# Patient Record
Sex: FEMALE | Race: WHITE | NOT HISPANIC OR LATINO | URBAN - METROPOLITAN AREA
[De-identification: names, ages, dates, MRNs, and addresses within clinical notes are randomized per-mention and may not be internally consistent; named-entity substitution may affect disease eponyms.]

---

## 2017-10-09 ENCOUNTER — OFFICE VISIT (OUTPATIENT)
Dept: OBSTETRICS AND GYNECOLOGY | Age: 42
End: 2017-10-09

## 2017-10-09 VITALS
WEIGHT: 180 LBS | HEIGHT: 66 IN | BODY MASS INDEX: 28.93 KG/M2 | DIASTOLIC BLOOD PRESSURE: 82 MMHG | SYSTOLIC BLOOD PRESSURE: 122 MMHG

## 2017-10-09 DIAGNOSIS — Z11.51 SCREENING FOR HPV (HUMAN PAPILLOMAVIRUS): ICD-10-CM

## 2017-10-09 DIAGNOSIS — Z01.419 VISIT FOR GYNECOLOGIC EXAMINATION: Primary | ICD-10-CM

## 2017-10-09 PROCEDURE — 99396 PREV VISIT EST AGE 40-64: CPT | Performed by: OBSTETRICS & GYNECOLOGY

## 2017-10-09 RX ORDER — MULTIVITAMIN
TABLET ORAL
COMMUNITY

## 2017-10-09 RX ORDER — FOLIC ACID 1 MG/1
1 TABLET ORAL DAILY
COMMUNITY

## 2017-10-09 NOTE — PROGRESS NOTES
"Subjective     Chief Complaint   Patient presents with   • Gynecologic Exam     annual exam       History of Present Illness    Tanvi Gaytan is a 42 y.o.  who presents for annual exam.  Her menses are regular every 28-30 days, lasting 4-7 days, dysmenorrhea none   No gyn issues  Obstetric History:  OB History      Para Term  AB Living    6 4 2 2 2 2    SAB TAB Ectopic Multiple Live Births    2    2         Menstrual History:     Patient's last menstrual period was 10/03/2017 (exact date).         Current contraception: vasectomy  History of abnormal Pap smear: no  Received Gardasil immunization: no  Perform regular self breast exam: yes - monthly  Family history of uterine or ovarian cancer: no  Family History of colon cancer: no  Family history of breast cancer: yes - distant cousin    Mammogram: done today.  Colonoscopy: up to date.  DEXA: not indicated.    Exercise: moderately active  Calcium/Vitamin D: adequate intake    The following portions of the patient's history were reviewed and updated as appropriate: allergies, current medications, past family history, past medical history, past social history, past surgical history and problem list.    Review of Systems    Review of Systems   Constitutional: Negative for fatigue.   Respiratory: Negative for shortness of breath.    Gastrointestinal: Negative for abdominal pain.   Genitourinary: Negative for dysuria. negative for abnormal bleeding or pelvic pain  Neurological: Negative for headaches.   Psychiatric/Behavioral: Negative for dysphoric mood.         Objective   Physical Exam    /82  Ht 66\" (167.6 cm)  Wt 180 lb (81.6 kg)  LMP 10/03/2017 (Exact Date)  BMI 29.05 kg/m2    General:   alert, appears stated age, cooperative and no distress   Neck: no asymmetry, masses, or scars and thyroid normal to palpation   Heart: regular rate and rhythm, S1, S2 normal, no murmur, click, rub or gallop   Lungs: clear to auscultation bilaterally "   Abdomen: soft, non-tender, without masses or organomegaly   Breast: inspection negative, no nipple discharge or bleeding, no masses or nodularity palpable and no axilllary adenopathy   Vulva: normal, Bartholin's, Urethra, [place]'s normal   Vagina: normal mucosa, normal discharge   Cervix: no lesions   Uterus: normal size, mobile, non-tender, normal shape and consistency   Adnexa: normal adnexa and no mass, fullness, tenderness   Rectal: not indicated     Assessment/Plan   Tanvi was seen today for gynecologic exam.    Diagnoses and all orders for this visit:    Visit for gynecologic examination  -     IGP, Apt HPV,rfx 16 / 18,45 - ThinPrep Vial, Cervix    Screening for HPV (human papillomavirus)  -     IGP, Apt HPV,rfx 16 / 18,45 - ThinPrep Vial, Cervix        All questions answered.  Breast self exam technique reviewed and patient encouraged to perform self-exam monthly.  Discussed healthy lifestyle modifications.  Recommended 30 minutes of aerobic exercise five times per week.  Pap negative last year with negative HPV, no ECC's noted so repeated-will call pt with results

## 2017-10-12 LAB
CYTOLOGIST CVX/VAG CYTO: NORMAL
CYTOLOGY CVX/VAG DOC THIN PREP: NORMAL
DX ICD CODE: NORMAL
HIV 1 & 2 AB SER-IMP: NORMAL
HPV I/H RISK 4 DNA CVX QL PROBE+SIG AMP: NEGATIVE
OTHER STN SPEC: NORMAL
PATH REPORT.FINAL DX SPEC: NORMAL
STAT OF ADQ CVX/VAG CYTO-IMP: NORMAL

## 2017-10-13 ENCOUNTER — TELEPHONE (OUTPATIENT)
Dept: OBSTETRICS AND GYNECOLOGY | Age: 42
End: 2017-10-13

## 2017-10-13 NOTE — TELEPHONE ENCOUNTER
----- Message from Marcela Winters MD sent at 10/13/2017  9:19 AM EDT -----  Call pt, pap is negative and satisfactory with endocervical cells this year, negative hpv also

## 2018-10-23 ENCOUNTER — OFFICE VISIT (OUTPATIENT)
Dept: OBSTETRICS AND GYNECOLOGY | Age: 43
End: 2018-10-23

## 2018-10-23 ENCOUNTER — PROCEDURE VISIT (OUTPATIENT)
Dept: OBSTETRICS AND GYNECOLOGY | Age: 43
End: 2018-10-23

## 2018-10-23 ENCOUNTER — APPOINTMENT (OUTPATIENT)
Dept: WOMENS IMAGING | Facility: HOSPITAL | Age: 43
End: 2018-10-23

## 2018-10-23 VITALS
DIASTOLIC BLOOD PRESSURE: 78 MMHG | HEIGHT: 66 IN | WEIGHT: 185.6 LBS | BODY MASS INDEX: 29.83 KG/M2 | SYSTOLIC BLOOD PRESSURE: 112 MMHG

## 2018-10-23 DIAGNOSIS — Z12.31 VISIT FOR SCREENING MAMMOGRAM: Primary | ICD-10-CM

## 2018-10-23 DIAGNOSIS — Z01.419 ENCOUNTER FOR GYNECOLOGICAL EXAMINATION: Primary | ICD-10-CM

## 2018-10-23 PROCEDURE — 77067 SCR MAMMO BI INCL CAD: CPT | Performed by: RADIOLOGY

## 2018-10-23 PROCEDURE — 77067 SCR MAMMO BI INCL CAD: CPT | Performed by: OBSTETRICS & GYNECOLOGY

## 2018-10-23 PROCEDURE — 99396 PREV VISIT EST AGE 40-64: CPT | Performed by: OBSTETRICS & GYNECOLOGY

## 2018-10-23 NOTE — PROGRESS NOTES
"Subjective     Chief Complaint   Patient presents with   • Gynecologic Exam     annual exam  Last pap 10/10/17-, HPV-, MG 10/9/17       History of Present Illness    Tanvi Gaytan is a 43 y.o.  who presents for annual exam.  Her menses are regular every 28-30 days, lasting 4-7 days, dysmenorrhea none   Pt denies any gyn issues  Only new history is serious reaction to bactrim    Obstetric History:  OB History      Para Term  AB Living    6 4 2 2 2 2    SAB TAB Ectopic Molar Multiple Live Births    2         2         Menstrual History:     Patient's last menstrual period was 10/02/2018 (exact date).         Current contraception: vasectomy  History of abnormal Pap smear: no  Received Gardasil immunization: no  Perform regular self breast exam: yes - regularly  Family history of uterine or ovarian cancer: no  Family History of colon cancer: no  Family history of breast cancer: yes - distant cousin with breast cancer    Mammogram: ordered today at office  Colonoscopy: not indicated.  DEXA: not indicated.    Exercise: moderately active  Calcium/Vitamin D: adequate intake    The following portions of the patient's history were reviewed and updated as appropriate: allergies, current medications, past family history, past medical history, past social history, past surgical history and problem list.    Review of Systems    Review of Systems   Constitutional: Negative for fatigue.   Respiratory: Negative for shortness of breath.    Gastrointestinal: Negative for abdominal pain.   Genitourinary: Negative for dysuria. neg for abnormal bleeding  Neurological: Negative for headaches.   Psychiatric/Behavioral: Negative for dysphoric mood.         Objective   Physical Exam    /78   Ht 167.6 cm (66\")   Wt 84.2 kg (185 lb 9.6 oz)   LMP 10/02/2018 (Exact Date)   BMI 29.96 kg/m²     General:   alert, appears stated age, cooperative and no distress   Neck: no asymmetry, masses, or scars and thyroid " normal to palpation   Heart: regular rate and rhythm, S1, S2 normal, no murmur, click, rub or gallop   Lungs: clear to auscultation bilaterally   Abdomen: soft, non-tender, without masses or organomegaly   Breast: inspection negative, no nipple discharge or bleeding, no masses or nodularity palpable and no axillary adneopathy   Vulva: normal, Bartholin's, Urethra, [place]'s normal   Vagina: normal mucosa, normal discharge   Cervix: no lesions   Uterus: normal size, mobile, non-tender, normal shape and consistency   Adnexa: normal adnexa and no mass, fullness, tenderness   Rectal: not indicated     Assessment/Plan   Tanvi was seen today for gynecologic exam.    Diagnoses and all orders for this visit:    Encounter for gynecological examination        All questions answered.  Breast self exam technique reviewed and patient encouraged to perform self-exam monthly.  Discussed healthy lifestyle modifications.  Recommended 30 minutes of aerobic exercise five times per week.  Pap deferred due to neg pap and hpv 2017, pt agrees    Advised pt to call if she does not receive normal letter for mammogram

## 2019-10-25 ENCOUNTER — APPOINTMENT (OUTPATIENT)
Dept: WOMENS IMAGING | Facility: HOSPITAL | Age: 44
End: 2019-10-25

## 2019-10-25 ENCOUNTER — PROCEDURE VISIT (OUTPATIENT)
Dept: OBSTETRICS AND GYNECOLOGY | Age: 44
End: 2019-10-25

## 2019-10-25 ENCOUNTER — OFFICE VISIT (OUTPATIENT)
Dept: OBSTETRICS AND GYNECOLOGY | Age: 44
End: 2019-10-25

## 2019-10-25 VITALS
BODY MASS INDEX: 30.57 KG/M2 | HEIGHT: 66 IN | WEIGHT: 190.2 LBS | DIASTOLIC BLOOD PRESSURE: 70 MMHG | SYSTOLIC BLOOD PRESSURE: 110 MMHG

## 2019-10-25 DIAGNOSIS — Z01.419 ENCOUNTER FOR GYNECOLOGICAL EXAMINATION: Primary | ICD-10-CM

## 2019-10-25 DIAGNOSIS — Z12.31 VISIT FOR SCREENING MAMMOGRAM: Primary | ICD-10-CM

## 2019-10-25 PROBLEM — T78.40XA ALLERGIC REACTION CAUSED BY A DRUG: Status: ACTIVE | Noted: 2018-06-17

## 2019-10-25 PROCEDURE — 77067 SCR MAMMO BI INCL CAD: CPT | Performed by: RADIOLOGY

## 2019-10-25 PROCEDURE — 99396 PREV VISIT EST AGE 40-64: CPT | Performed by: OBSTETRICS & GYNECOLOGY

## 2019-10-25 PROCEDURE — 77067 SCR MAMMO BI INCL CAD: CPT | Performed by: OBSTETRICS & GYNECOLOGY

## 2019-10-25 NOTE — PROGRESS NOTES
"Subjective   History of Present Illness    Tanvi Gaytan is a 44 y.o. female who presents for annual exam.  She denies any gyn issues. No problems except difficulty losing weight.    Obstetric History:  OB History      Para Term  AB Living    6 4 2 2 2 2    SAB TAB Ectopic Molar Multiple Live Births    2         2         Menstrual History:     Patient's last menstrual period was 10/08/2019 (exact date).              Current contraception: vasectomy  History of abnormal Pap smear: no  Received Gardasil immunization: no  Family history of uterine or ovarian cancer: no  Family History of cervical cancer: no  Family History of colon cancer/colon polyps: no  Regular self breast exam: yes  Family history of breast cancer: no     Mammogram: done today      The following portions of the patient's history were reviewed and updated as appropriate: allergies, current medications, past family history, past medical history, past social history, past surgical history and problem list.    Review of Systems    Constitutional: pos for difficulty losing weight  Respiratory: negative  Cardiovascular: negative  Gastrointestinal: neg for change in bowel habits or blood in stool  Genitourinary:neg for abnormal or excessive bleeding  Integument/breast: negative  Neurological: negative for headaches  Behavioral/Psych: negative for depression     Objective   Physical Exam    /70   Ht 167.6 cm (66\")   Wt 86.3 kg (190 lb 3.2 oz)   LMP 10/08/2019 (Exact Date)   Breastfeeding? No   BMI 30.70 kg/m²     General:   alert, appears stated age, cooperative and no distress   Heart: regular rate and rhythm   Lungs: clear to auscultation bilaterally   Abdomen: soft, non-tender, without masses or organomegaly   Breast: inspection negative, no nipple discharge or bleeding, no masses or nodularity palpable and no axillary adenopathy   Vulva: normal, Bartholin's, Urethra, [place]'s normal   Vagina: normal mucosa, normal discharge "   Cervix: no lesions   Uterus: normal size, mobile, non-tender, normal shape and consistency   Adnexa: normal adnexa and no mass, fullness, tenderness     Assessment/Plan   Tanvi was seen today for gynecologic exam.    Diagnoses and all orders for this visit:    Encounter for gynecological examination        Discussed SBE and recommended  Recommended regular exercise atleast 5 times / week  Discussed diet options  Pap deferred as negative pap and hpv 2017, pt agrees

## 2020-10-27 ENCOUNTER — OFFICE VISIT (OUTPATIENT)
Dept: OBSTETRICS AND GYNECOLOGY | Age: 45
End: 2020-10-27

## 2020-10-27 ENCOUNTER — PROCEDURE VISIT (OUTPATIENT)
Dept: OBSTETRICS AND GYNECOLOGY | Age: 45
End: 2020-10-27

## 2020-10-27 ENCOUNTER — APPOINTMENT (OUTPATIENT)
Dept: WOMENS IMAGING | Facility: HOSPITAL | Age: 45
End: 2020-10-27

## 2020-10-27 VITALS
WEIGHT: 187.8 LBS | BODY MASS INDEX: 30.18 KG/M2 | SYSTOLIC BLOOD PRESSURE: 100 MMHG | HEIGHT: 66 IN | DIASTOLIC BLOOD PRESSURE: 58 MMHG

## 2020-10-27 DIAGNOSIS — Z11.51 ENCOUNTER FOR SCREENING FOR HUMAN PAPILLOMAVIRUS (HPV): ICD-10-CM

## 2020-10-27 DIAGNOSIS — Z12.31 VISIT FOR SCREENING MAMMOGRAM: Primary | ICD-10-CM

## 2020-10-27 DIAGNOSIS — Z01.419 ENCOUNTER FOR GYNECOLOGICAL EXAMINATION: Primary | ICD-10-CM

## 2020-10-27 PROCEDURE — 77067 SCR MAMMO BI INCL CAD: CPT | Performed by: OBSTETRICS & GYNECOLOGY

## 2020-10-27 PROCEDURE — 77067 SCR MAMMO BI INCL CAD: CPT | Performed by: RADIOLOGY

## 2020-10-27 PROCEDURE — 99396 PREV VISIT EST AGE 40-64: CPT | Performed by: OBSTETRICS & GYNECOLOGY

## 2020-10-27 NOTE — PROGRESS NOTES
".  Subjective     Chief Complaint   Patient presents with   • Gynecologic Exam       AE  10/10/17-, HPV-, MG 10/25/19       History of Present Illness    Tanvi Gaytan is a 45 y.o.  who presents for annual exam.  Her menses are regular every 28-30 days, lasting 4-7 days, dysmenorrhea none   She is working in [employer]    Obstetric History:  OB History        6    Para   4    Term   2       2    AB   2    Living   2       SAB   2    TAB        Ectopic        Molar        Multiple        Live Births   2               Menstrual History:     Patient's last menstrual period was 10/03/2020 (exact date).         Current contraception: vasectomy  History of abnormal Pap smear: no  Received Gardasil immunization: no  Perform regular self breast exam: no  Family history of uterine or ovarian cancer: no  Family History of colon cancer: no  Family history of breast cancer: yes - second cousin with breast cancer    Mammogram: scheduled today  Colonoscopy: not indicated.  DEXA: not indicated.    Exercise: moderately active  Calcium/Vitamin D: adequate intake    The following portions of the patient's history were reviewed and updated as appropriate: allergies, current medications, past family history, past medical history, past social history, past surgical history and problem list.    Review of Systems    Review of Systems   Constitutional: Negative for fatigue.   Respiratory: Negative for shortness of breath.    Gastrointestinal: Negative for abdominal pain. Negative for change in bowel habits  Genitourinary: Negative for dysuria. Negative for abnormal bleeding  Neurological: Negative for headaches.   Psychiatric/Behavioral: Negative for dysphoric mood.         Objective   Physical Exam    /58   Ht 167.6 cm (66\")   Wt 85.2 kg (187 lb 12.8 oz)   LMP 10/03/2020 (Exact Date)   Breastfeeding No   BMI 30.31 kg/m²   General:   alert and no distress   Heart: regular rate and rhythm   Lungs: clear to " auscultation bilaterally   Breast: Negative inspection, no masses, retractions, nipple discharge or axillary adenopathy   Neck: Supple, no thyromegaly   Abdomen: soft, non-tender. No masses,  no organomegaly   Pelvis: External genitalia: normal general appearance  Urinary system: urethral meatus normal  Vaginal: normal mucosa without prolapse or lesions  Cervix: normal appearance  Adnexa: no masses or tenderness  Uterus: normal, nontender   Extremities: Extremities normal, atraumatic, no cyanosis or edema   Neurologic: Alert and oriented   Psychiatric: Normal affect, judgement, and mood     Assessment/Plan   Diagnoses and all orders for this visit:    1. Encounter for gynecological examination (Primary)  -     IGP, Apt HPV,rfx 16 / 18,45    2. Encounter for screening for human papillomavirus (HPV)  -     IGP, Apt HPV,rfx 16 / 18,45        All questions answered.  Breast self exam technique reviewed and patient encouraged to perform self-exam monthly.  Discussed healthy lifestyle modifications.  Recommended 30 minutes of aerobic exercise five times per week.  Discussed calcium needs to prevent osteoporosis.    Advised pt to call if she does not receive results of pap and mammogram within 2 weeks

## 2020-10-30 LAB
CYTOLOGIST CVX/VAG CYTO: NORMAL
CYTOLOGY CVX/VAG DOC CYTO: NORMAL
CYTOLOGY CVX/VAG DOC THIN PREP: NORMAL
DX ICD CODE: NORMAL
HIV 1 & 2 AB SER-IMP: NORMAL
HPV I/H RISK 4 DNA CVX QL PROBE+SIG AMP: NEGATIVE
OTHER STN SPEC: NORMAL
STAT OF ADQ CVX/VAG CYTO-IMP: NORMAL

## 2020-11-03 ENCOUNTER — TELEPHONE (OUTPATIENT)
Dept: OBSTETRICS AND GYNECOLOGY | Age: 45
End: 2020-11-03

## 2021-12-07 ENCOUNTER — OFFICE VISIT (OUTPATIENT)
Dept: OBSTETRICS AND GYNECOLOGY | Age: 46
End: 2021-12-07

## 2021-12-07 ENCOUNTER — APPOINTMENT (OUTPATIENT)
Dept: WOMENS IMAGING | Facility: HOSPITAL | Age: 46
End: 2021-12-07

## 2021-12-07 ENCOUNTER — PROCEDURE VISIT (OUTPATIENT)
Dept: OBSTETRICS AND GYNECOLOGY | Age: 46
End: 2021-12-07

## 2021-12-07 VITALS
DIASTOLIC BLOOD PRESSURE: 70 MMHG | SYSTOLIC BLOOD PRESSURE: 120 MMHG | BODY MASS INDEX: 29.25 KG/M2 | HEIGHT: 66 IN | WEIGHT: 182 LBS

## 2021-12-07 DIAGNOSIS — Z01.419 ENCOUNTER FOR GYNECOLOGICAL EXAMINATION: Primary | ICD-10-CM

## 2021-12-07 DIAGNOSIS — Z12.31 VISIT FOR SCREENING MAMMOGRAM: Primary | ICD-10-CM

## 2021-12-07 DIAGNOSIS — R21 RASH: ICD-10-CM

## 2021-12-07 PROCEDURE — 77063 BREAST TOMOSYNTHESIS BI: CPT | Performed by: RADIOLOGY

## 2021-12-07 PROCEDURE — 77067 SCR MAMMO BI INCL CAD: CPT | Performed by: OBSTETRICS & GYNECOLOGY

## 2021-12-07 PROCEDURE — 77067 SCR MAMMO BI INCL CAD: CPT | Performed by: RADIOLOGY

## 2021-12-07 PROCEDURE — 77063 BREAST TOMOSYNTHESIS BI: CPT | Performed by: OBSTETRICS & GYNECOLOGY

## 2021-12-07 PROCEDURE — 99396 PREV VISIT EST AGE 40-64: CPT | Performed by: OBSTETRICS & GYNECOLOGY

## 2021-12-07 NOTE — PROGRESS NOTES
.  Subjective     Chief Complaint   Patient presents with   • Gynecologic Exam     AE, last pap 10/27/2020 neg/hpv neg, mg 2021       History of Present Illness    Tanvi Gaytan is a 46 y.o.  who presents for annual exam.  Her menses are regular every 26 days, lasting 4-7 days, dysmenorrhea none   The boys are doing well and in school in [facility].   She is still working at [employer].     She has no gyn complaints. She has had some issues with skin rash. She reports any site of skin contact can cause irritation. She also had irritation from her underwire bra. This improved with changing bras but she still has resolving rash on bilateral lower breasts. She notes irritation on medial chest when she wears sports bras and occasional groin irritation from underwear. She does not have a dermatologist but would be interested in consult for recommendations.     Obstetric History:  OB History        6    Para   4    Term   2       2    AB   2    Living   2       SAB   2    IAB        Ectopic        Molar        Multiple        Live Births   2               Menstrual History:     Patient's last menstrual period was 2021.         Current contraception: vasectomy  History of abnormal Pap smear: no  Received Gardasil immunization: no  Perform regular self breast exam: yes - reg  Family history of uterine or ovarian cancer: no  Family History of colon cancer: no  Family history of breast cancer: distant cousin    Mammogram: done today.  Colonoscopy: not indicated.  DEXA: not indicated.    Exercise: moderately active  Calcium/Vitamin D: adequate intake    The following portions of the patient's history were reviewed and updated as appropriate: allergies, current medications, past family history, past medical history, past social history, past surgical history and problem list.    Review of Systems    Review of Systems   Constitutional: Negative for fatigue.   Respiratory: Negative for shortness of  "breath.    Gastrointestinal: Negative for abdominal pain.   Genitourinary: Negative for abnormal bleeding or pelvic pain   Neurological: Negative for headaches.   Psychiatric/Behavioral: Negative for dysphoric mood.         Objective   Physical Exam    /70   Ht 167.6 cm (66\")   Wt 82.6 kg (182 lb)   LMP 11/22/2021   BMI 29.38 kg/m²   General:   alert and no distress   Heart: regular rate and rhythm   Lungs: clear to auscultation bilaterally   Breast: Inspection with linear papules across on dependent portion of both breasts;  no masses, retractions, nipple discharge or axillary adenopathy noted.    Neck: Supple, no thyromegaly   Abdomen: soft, non-tender. No masses,  no organomegaly   Pelvis: External genitalia: normal general appearance  Urinary system: urethral meatus normal  Vaginal: normal mucosa without prolapse or lesions  Cervix: normal appearance  Adnexa: no masses or tenderness  Uterus: normal, nontender   Extremities: Extremities normal, atraumatic, no cyanosis or edema   Neurologic: Alert and oriented   Psychiatric: Normal affect, judgement, and mood     Assessment/Plan   Diagnoses and all orders for this visit:    1. Encounter for gynecological examination (Primary)    2. Rash  -     Ambulatory Referral to Dermatology        All questions answered.  Breast self exam technique reviewed and patient encouraged to perform self-exam monthly.  Discussed healthy lifestyle modifications.  Recommended 30 minutes of aerobic exercise five times per week.  Discussed calcium needs to prevent osteoporosis.  Pap deferred as up to date and pt agrees  Advised pt to call if she does not receive results of mammogram within 2 weeks    Pt notes the rash on both breasts was related to her prior bras and is resolving. She reports she had scattered papules on the sites of underwire contact bilaterally that is resolving now. She has noted more frequent skin irritation in various areas and plan derm referral for further " evaluation.

## 2022-12-13 ENCOUNTER — APPOINTMENT (OUTPATIENT)
Dept: WOMENS IMAGING | Facility: HOSPITAL | Age: 47
End: 2022-12-13

## 2022-12-13 ENCOUNTER — OFFICE VISIT (OUTPATIENT)
Dept: OBSTETRICS AND GYNECOLOGY | Age: 47
End: 2022-12-13

## 2022-12-13 ENCOUNTER — PROCEDURE VISIT (OUTPATIENT)
Dept: OBSTETRICS AND GYNECOLOGY | Age: 47
End: 2022-12-13

## 2022-12-13 VITALS
BODY MASS INDEX: 30.76 KG/M2 | WEIGHT: 191.4 LBS | SYSTOLIC BLOOD PRESSURE: 110 MMHG | HEIGHT: 66 IN | DIASTOLIC BLOOD PRESSURE: 70 MMHG

## 2022-12-13 DIAGNOSIS — Z12.31 VISIT FOR SCREENING MAMMOGRAM: Primary | ICD-10-CM

## 2022-12-13 DIAGNOSIS — Z91.89 AT HIGH RISK FOR BREAST CANCER: ICD-10-CM

## 2022-12-13 DIAGNOSIS — Z11.51 ENCOUNTER FOR SCREENING FOR HUMAN PAPILLOMAVIRUS (HPV): ICD-10-CM

## 2022-12-13 DIAGNOSIS — Z01.419 ENCOUNTER FOR GYNECOLOGICAL EXAMINATION: Primary | ICD-10-CM

## 2022-12-13 PROCEDURE — 77063 BREAST TOMOSYNTHESIS BI: CPT | Performed by: RADIOLOGY

## 2022-12-13 PROCEDURE — 77067 SCR MAMMO BI INCL CAD: CPT | Performed by: OBSTETRICS & GYNECOLOGY

## 2022-12-13 PROCEDURE — 77063 BREAST TOMOSYNTHESIS BI: CPT | Performed by: OBSTETRICS & GYNECOLOGY

## 2022-12-13 PROCEDURE — 99396 PREV VISIT EST AGE 40-64: CPT | Performed by: OBSTETRICS & GYNECOLOGY

## 2022-12-13 PROCEDURE — 77067 SCR MAMMO BI INCL CAD: CPT | Performed by: RADIOLOGY

## 2022-12-13 RX ORDER — ATORVASTATIN CALCIUM 10 MG/1
10 TABLET, FILM COATED ORAL DAILY
COMMUNITY
Start: 2022-08-04 | End: 2023-08-10

## 2022-12-13 NOTE — PROGRESS NOTES
".  Subjective     Chief Complaint   Patient presents with   • Gynecologic Exam     Annual exam, Last Pap 10/27/2020 NEG, HPV NEG, Mammogram today, Pt has no complaints today        History of Present Illness    Tanvi Gaytan is a 47 y.o.  who presents for annual exam.  Her menses are regular every 28-30 days, lasting 4-7 days, dysmenorrhea none     The boys are 13 and 15 yo and doing well.  Her mother was dx with breast cancer this year. She is doing well.     Obstetric History:  OB History        6    Para   4    Term   2       2    AB   2    Living   2       SAB   2    IAB        Ectopic        Molar        Multiple        Live Births   2               Menstrual History:     Patient's last menstrual period was 12/10/2022 (exact date).         Current contraception: vasectomy  History of abnormal Pap smear: no  Received Gardasil immunization: no  Perform regular self breast exam: yes  Family history of uterine or ovarian cancer: no  Family History of colon cancer: no  Family history of breast cancer: yes - mother-dx with stage 1 this year, age 67    Mammogram: done today.  Colonoscopy: recommended. She had neg cologard and reports she will book colonoscopy later through her primary  DEXA: not indicated.    Exercise: moderately active  Calcium/Vitamin D: adequate intake    The following portions of the patient's history were reviewed and updated as appropriate: allergies, current medications, past family history, past medical history, past social history, past surgical history and problem list.    Review of Systems    Review of Systems   Constitutional: Negative for fatigue.   Respiratory: Negative for shortness of breath.    Gastrointestinal: Negative for abdominal pain.   Genitourinary: Negative for abnormal bleeding  Neurological: Negative for headaches.   Psychiatric/Behavioral: Negative for dysphoric mood.         Objective   Physical Exam    /70   Ht 167.6 cm (66\")   Wt 86.8 kg " (191 lb 6.4 oz)   LMP 12/10/2022 (Exact Date)   BMI 30.89 kg/m²   General:   Alert, in no distress   Heart: regular rate and rhythm   Lungs: clear to auscultation bilaterally   Breast: Inspection negative; no masses, retractions, nipple discharge or axillary adenopathy in either breast   Neck: Supple, no thyromegaly   Abdomen: Soft, no tenderness or guarding   Pelvis: External genitalia: normal general appearance  Urinary system: urethral meatus normal  Vaginal: normal mucosa without prolapse or lesions  Cervix: normal appearance  Adnexa: no masses or tenderness  Uterus: normal single, nontender   Extremities: Normal without edema   Neurologic: Alert and oriented   Psychiatric: Normal affect, judgment and mood     Assessment & Plan   Diagnoses and all orders for this visit:    1. Encounter for gynecological examination (Primary)  -     IGP, Apt HPV,rfx 16 / 18,45    2. At high risk for breast cancer  -     Ambulatory Referral to High Risk Breast  -     MRI Breast Bilateral With & Without Contrast; Future    3. Encounter for screening for human papillomavirus (HPV)  -     IGP, Apt HPV,rfx 16 / 18,45        All questions answered.  Breast self exam technique reviewed and patient encouraged to perform self-exam monthly.  Discussed healthy lifestyle modifications.  Recommended 30 minutes of aerobic exercise five times per week.  Advised her to call if she does not receive results of mammogram and pap within 2 weeks.      Reva risk 20.1, ruth estrellazick 14.7 %, discussed with pt, she is interested in high risk screening. Advised her to call if she does not receive contact to book high risk clinic consult and MRI in 6 months. Discussed MRI and mammogram are recommended yearly if high risk and usually spaced Q 6 months.

## 2022-12-22 LAB
CYTOLOGIST CVX/VAG CYTO: NORMAL
CYTOLOGY CVX/VAG DOC CYTO: NORMAL
CYTOLOGY CVX/VAG DOC THIN PREP: NORMAL
DX ICD CODE: NORMAL
HIV 1 & 2 AB SER-IMP: NORMAL
HPV I/H RISK 4 DNA CVX QL PROBE+SIG AMP: NEGATIVE
Lab: NORMAL
OTHER STN SPEC: NORMAL
STAT OF ADQ CVX/VAG CYTO-IMP: NORMAL

## 2023-01-25 ENCOUNTER — OFFICE VISIT (OUTPATIENT)
Dept: MAMMOGRAPHY | Facility: CLINIC | Age: 48
End: 2023-01-25
Payer: COMMERCIAL

## 2023-01-25 ENCOUNTER — PATIENT ROUNDING (BHMG ONLY) (OUTPATIENT)
Dept: MAMMOGRAPHY | Facility: CLINIC | Age: 48
End: 2023-01-25
Payer: COMMERCIAL

## 2023-01-25 ENCOUNTER — CLINICAL SUPPORT (OUTPATIENT)
Dept: GENETICS | Facility: HOSPITAL | Age: 48
End: 2023-01-25
Payer: COMMERCIAL

## 2023-01-25 VITALS
WEIGHT: 188 LBS | HEART RATE: 82 BPM | BODY MASS INDEX: 30.22 KG/M2 | HEIGHT: 66 IN | DIASTOLIC BLOOD PRESSURE: 93 MMHG | OXYGEN SATURATION: 98 % | SYSTOLIC BLOOD PRESSURE: 155 MMHG

## 2023-01-25 DIAGNOSIS — Z13.79 GENETIC TESTING: Primary | ICD-10-CM

## 2023-01-25 DIAGNOSIS — Z91.89 AT HIGH RISK FOR BREAST CANCER: Primary | ICD-10-CM

## 2023-01-25 DIAGNOSIS — F41.9 ANXIETY: ICD-10-CM

## 2023-01-25 DIAGNOSIS — Z80.42 FAMILY HISTORY OF PROSTATE CANCER: ICD-10-CM

## 2023-01-25 DIAGNOSIS — Z80.3 FAMILY HISTORY OF BREAST CANCER: ICD-10-CM

## 2023-01-25 PROCEDURE — 96040: CPT | Performed by: GENETIC COUNSELOR, MS

## 2023-01-25 PROCEDURE — 99204 OFFICE O/P NEW MOD 45 MIN: CPT | Performed by: SURGERY

## 2023-01-25 RX ORDER — DIAZEPAM 5 MG/1
5 TABLET ORAL TAKE AS DIRECTED
Qty: 2 TABLET | Refills: 0 | Status: SHIPPED | OUTPATIENT
Start: 2023-01-25 | End: 2023-01-27

## 2023-01-25 NOTE — LETTER
January 25, 2023     Marcela Winters MD  [address] [facility] [address]    Patient: Tanvi Gaytan   YOB: 1975   Date of Visit: 1/25/2023       Dear Dr. Singh MD:    Thank you for referring Tanvi Gaytan to me for evaluation. Below are the relevant portions of my assessment and plan of care.  Assessment:  1. At high risk for breast cancer    The patient is aware that high risk breast patients are generally identified because of a genetic mutation, strong family history for breast cancer, LCIS, atypical hyperplasia, or a history of radiation to the chest wall under the age of 30.  In her case she does have a strong family history for breast cancer.  She also has a maternal cousin with breast cancer at a very young age.  This would qualify her for genetic testing and she would like to proceed with that.  Our risk assessment models have estimated her lifetime risk between 20 and 27%.  Her 5-year risk is estimated at 1.8%.  Based on these findings she would qualify to have supplemental MRI in addition to her yearly mammograms.  I think she would also benefit from a clinical breast examination every 6 months.  This could be arranged by alternating 6-month visits with Dr. Winters.  The patient would like to proceed along those lines.  She also could consider medical risk reduction with her slightly elevated 5-year risk for breast cancer.  After discussing the medications used and their potential side effects the patient prefers to hold on that for now.  We can always reconsider as time goes on.    We also talked about the benefits of exercise, maintaining an ideal body weight, and limiting alcohol intake.      Plan:  1.  She already has an MRI scheduled for June and I will be on the look out for those results.   2.  I will see her back in the office in July after her MRI.    If you have questions, please do not hesitate to call me. I look forward to following Tanvi along with you.          Sincerely,        Moody Liz MD        CC: MD Shalonda Dickerson, Moody CARRASCO MD  [phone]  Signed  Chief Complaint: Tanvi Gaytan is a 47 y.o.. female here today for No chief complaint on file.        History of Present Illness:  Patient presents with management of breast cancer risk.   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  Her mother was diagnosed with breast cancer in her 60s.  She also has a maternal cousin diagnosed with breast cancer in her 30s.  The patient has not had any prior breast biopsies.  She has 2 sons and no daughters.  Her imaging was last performed 2022.  I have personally reviewed those imaging studies along with the reports.  The patient does have scattered fibroglandular densities.  I do not see any masses, suspicious microcalcifications, or areas of architectural distortion.    Review of Systems:  Review of Systems   Skin:        The patient denies any noticeable changes to the skin of the breast   All other systems reviewed and are negative.     I have reviewed the ROS as documented by the MA/LPN/RN Moody Liz MD      Past Medical and Surgical History:  Breast Biopsy History:  Patient has not had a breast biopsy in the past.  Breast Cancer HIstory:  Patient does not have a past medical history of breast cancer.  Breast Operations, and year:  0  Social History     Tobacco Use   Smoking Status Never   Smokeless Tobacco Never     Obstetric History:  Patient is premenopausal, first day of last period: 23  Number of pregnancies:4  Number of live births: 2  Number of abortions or miscarriages: 2  Age of delivery of first child: 32  Patient breast fed, for the following lenth of time:6 mons  Length of time taking birth control pills:6 yrs  Patient has never taken hormone replacement    Past Surgical History:   Procedure Laterality Date   •  SECTION     • DILATATION AND CURETTAGE     •  LIPOMA EXCISION     • TONSILLECTOMY         Past Medical History:   Diagnosis Date   • Hypertension    • MTHFR mutation        Prior Hospitalizations, other than for surgery or childbirth, and year:  None    Social History:  Patient is .  Patient has 2 sons.    Family History:  Family History   Problem Relation Age of Onset   • Coronary artery disease Paternal Grandfather    • Hypertension Paternal Grandfather    • Coronary artery disease Paternal Grandmother    • Diabetes Paternal Grandmother    • Hypertension Paternal Grandmother    • Coronary artery disease Maternal Grandmother    • Hypertension Maternal Grandmother    • Coronary artery disease Maternal Grandfather    • Hypertension Maternal Grandfather    • Breast cancer Cousin    • No Known Problems Mother    • No Known Problems Father    • No Known Problems Sister    • No Known Problems Brother    • No Known Problems Daughter    • No Known Problems Son    • No Known Problems Maternal Aunt    • No Known Problems Paternal Aunt    • Deep vein thrombosis Paternal Uncle    • BRCA 1/2 Neg Hx    • Colon cancer Neg Hx    • Endometrial cancer Neg Hx    • Ovarian cancer Neg Hx    • Uterine cancer Neg Hx    • Melanoma Neg Hx    • Prostate cancer Neg Hx        Vital Signs:  Vitals:    01/25/23 1304   BP: 155/93   Pulse: 82   SpO2: 98%       Medications:    Current Outpatient Prescriptions:     Current Outpatient Medications:   •  atorvastatin (LIPITOR) 10 MG tablet, Take 10 mg by mouth Daily., Disp: , Rfl:   •  folic acid (FOLVITE) 1 MG tablet, Take 1 mg by mouth Daily., Disp: , Rfl:   •  metoprolol tartrate (LOPRESSOR) 100 MG tablet, Take 100 mg by mouth 2 (Two) Times a Day., Disp: , Rfl:   •  Multiple Vitamin (MULTIVITAMIN) tablet, Take  by mouth., Disp: , Rfl:     Physical Examination:  General Appearance:   Patient is in no distress.  She is well kept and has a BMI of 30.4.  Psychiatric:  Patient with appropriate mood and affect. Alert and oriented to self,  time, and place.    Breast, RIGHT:  large sized, symmetric with the contralateral side.  Breast skin is without erythema, edema, rashes.   She does have a few irritated spots on her breast likely from some rubbing with her bra.  There is also a little bit of scar formation along the inferior part of the breast related to some problems with her underwire bra.  There are no visible abnormalities upon inspection during the arm-raising maneuver or with hands on hips in the sitting position. There is no nipple retraction, discharge or nipple/areolar skin changes.There are no masses palpable in the sitting or supine positions.  There is not much in the way of compacted breast tissue but she does have fairly uniform dense breast tissue throughout the breast.    Breast, LEFT:  large sized, symmetric with the contralateral side.  Breast skin is without erythema, edema, rashes.  There are no visible abnormalities upon inspection during the arm-raising maneuver or with hands on hips in the sitting position. There is no nipple retraction, discharge or nipple/areolar skin changes.There are no masses palpable in the sitting or supine positions.  There is not much compacted breast tissue along the inferior breast crease.  There is fibrocystic nodularity throughout the breast.    Lymphatic:  There is no axillary, cervical, infraclavicular, or supraclavicular adenopathy bilaterally.    Gastrointestinal:  Abdomen is soft, nondistended, and nontender.  There was no obvious hepatosplenomegaly or abdominal mass.  There are no scars from previous surgery.    Musculoskeletal:  Good strength in all 4 extremities.   There is good range of motion in both shoulders.        Assessment:  1. At high risk for breast cancer    The patient is aware that high risk breast patients are generally identified because of a genetic mutation, strong family history for breast cancer, LCIS, atypical hyperplasia, or a history of radiation to the chest wall  under the age of 30.  In her case she does have a strong family history for breast cancer.  She also has a maternal cousin with breast cancer at a very young age.  This would qualify her for genetic testing and she would like to proceed with that.  Our risk assessment models have estimated her lifetime risk between 20 and 27%.  Her 5-year risk is estimated at 1.8%.  Based on these findings she would qualify to have supplemental MRI in addition to her yearly mammograms.  I think she would also benefit from a clinical breast examination every 6 months.  This could be arranged by alternating 6-month visits with Dr. Winters.  The patient would like to proceed along those lines.  She also could consider medical risk reduction with her slightly elevated 5-year risk for breast cancer.  After discussing the medications used and their potential side effects the patient prefers to hold on that for now.  We can always reconsider as time goes on.    We also talked about the benefits of exercise, maintaining an ideal body weight, and limiting alcohol intake.      Plan:  1.  She already has an MRI scheduled for June and I will be on the look out for those results.   2.  I will see her back in the office in July after her MRI.    CPT coding:    Next Appointment:  No follow-ups on file.            EMR Dragon/transcription disclaimer:    Much of this encounter note is an electronic transcription/translocation of spoken language to printed text.  The electronic translation of spoken language may permit erroneous, or at times, nonsensical words or phrases to be inadvertently transcribed.  Although I have reviewed the note from such areas, some may still exist.

## 2023-01-25 NOTE — PROGRESS NOTES
Tanvi Gaytan, a 47-year-old female, was referred for genetic counseling due to a family history of breast cancer. Ms. Gaytan has no personal history of cancer.  She was 13 years old at menarche and had her first child at age 32. She is premenopausal and retains her uterus and ovaries.  Dr. Moody Liz previously calculated Ms. Gaytan’s lifetime risk for breast cancer based on her family history using computer modeling. He estimated Ms. Gaytan’s lifetime risk for breast cancer to be approximately 20.8% via Tyrer Cuzick v8. This wouldn’t be considered to be high risk for breast cancer. Ms. Gaytan’s most recent mammogram was in December 2022 and was normal. She has been referred to have a breast MRI and then will be alternating every 6 months between a mammogram and breast MRI. Ms. Gaytan has not yet had a colonoscopy but did have a normal Cologuard last year.  She was interested in discussing her risk for a hereditary cancer syndrome.   Ms. Gaytan decided to pursue comprehensive genetic testing to evaluate her risk of cancer, therefore the CancerNext Expanded Panel was ordered through [employer] which analyzes 77 genes associated with an increased cancer risk. Results are expected in 2-3 weeks.      PERTINENT FAMILY HISTORY: (See attached pedigree)   Mother:   Breast cancer, 60s  Pat Uncle:  Metastatic prostate cancer  Pat Cousin:  Head/Neck cancer  Pat Half-Aunt:  Lung cancer    Ms. Gaytan also mentioned two maternal first cousins once removed with cancer.  One with breast cancer in her 30s and one with thyroid cancer.  We do not have medical records to review regarding any of these diagnoses.       RISK ASSESSMENT:  Ms. Gaytan’s family history of breast cancer raises the question of a hereditary cancer syndrome.  NCCN guidelines for genetic testing for BRCA1/2 states that individuals with a close relative with metastatic prostate at any age may consider genetic testing. With Ms.  Lukas’s paternal uncle’s diagnosis, she would meet this criteria.  This risk assessment is based on the family history information provided at the time of the appointment.  The assessment could change in the future should new information be obtained.    GENETIC COUNSELING (30 minutes):  We reviewed the family history information in detail. Cases of cancer follow three general patterns: sporadic, familial, and hereditary.  While most cancer is sporadic, some cases appear to occur in family clusters.  These cases are said to be familial and account for 10-20% of cancer cases.  Familial cases may be due to a combination of shared genes and environmental factors among family members.  In even fewer cases, the risk for cancer is inherited, and the genes responsible for the increased cancer risk are known.       Family histories typical of hereditary cancer syndromes usually include multiple first- and second-degree relatives diagnosed with cancer types that define a syndrome.  These cases tend to be diagnosed at younger-than-expected ages and can be bilateral or multifocal.  The cancer in these families follows an autosomal dominant inheritance pattern, which indicates the likely presence of a mutation in a cancer susceptibility gene.  Children and siblings of an individual believed to carry this mutation have a 50% chance of inheriting that mutation, thereby inheriting the increased risk to develop cancer.  These mutations can be passed down from the maternal or the paternal lineage.     Due to Ms. Gaytan’s family history of breast cancer, we discussed hereditary breast cancer. Hereditary breast cancer accounts for 5-10% of all cases of breast cancer.  A significant proportion (50%) of hereditary breast cancer can be attributed to mutations in the BRCA1 and BRCA2 genes.  Mutations in these genes confer an increased risk for breast cancer, ovarian cancer, male breast cancer, prostate cancer, and pancreatic cancer.   Women with a BRCA1 or BRCA2 mutation have up to an 87% lifetime risk of breast cancer and up to a 44% risk of ovarian cancer.  There are other clinically significant breast cancer related genes in addition to BRCA1/2, including PALB2, LEROY, and CHEK2.     There are other hereditary cancer syndromes as well. Based on Ms. Gaytan’s family history and her desire to get more information regarding her personal risks, testing was pursued through a multigene panel evaluating several other genes known to increase the risk for cancer.     GENETIC TESTING:  The risks, benefits, and limitations of genetic testing and implications for clinical management following testing were reviewed.  DNA test results can influence decisions regarding screening and prevention.  Genetic testing can have significant psychological implications for both individuals and families. Also discussed was the possibility of employment and insurance discrimination based on genetic test results and the laws in place to prevent this, as well as the limitations of these laws.       We discussed panel testing, which would involve testing 77 genes associated with increased cancer risk. The implications of a positive or negative test result were discussed.  We also discussed the importance of testing an affected relative and how a negative result for Ms. Gaytan wouldn’t necessarily mean the cancers presenting in her family weren’t due to a genetic mutation that Ms. Gaytan did not inherit.  In general, a negative genetic test result is most informative if a mutation has first been established in an affected member of the family.  In cases where an affected individual is not available or interested in testing, it is appropriate to offer testing to an unaffected individual. We discussed the possibility that, in some cases, genetic test results may be ambiguous due to the identification of a genetic variant. These variants may or may not be associated with an  increased cancer risk. With multigene panel testing, it is not uncommon for a variant of uncertain significance (VUS) to be identified.  If a VUS is identified, testing family members is typically not recommended and screening recommendations are made based on the family history.  The laboratories that perform genetic testing work to reclassify the VUS and send out an amended report if and when a VUS is reclassified.  The majority of variant findings are ultimately reclassified to a negative result. Given Ms. Gaytan’s family history, a negative test result does not eliminate all cancer risk, although the risk would not be as high as it would with positive genetic testing.     PLAN: Genetic testing was ordered via the CancerNext Expanded Panel through [employer]. Results are expected in 2-3 weeks and will be called out to Ms. Gaytan. If she has any questions in the meantime, she is welcome to call me at [phone].      Yanira Costello MS, [facility] [place], [facility]  Licensed Certified Genetic Counselor

## 2023-01-25 NOTE — PROGRESS NOTES
January 25, 2023    Hello, may I speak with Tanvi Gaytan?    My name is Cristina    I am  with [facility]  [address] [facility] [address]  [facility] [address] [phone].    Before we get started may I verify your date of birth? 1975    I am calling to officially welcome you to our practice and ask about your recent visit. Is this a good time to talk?  Yes, in office    Tell me about your visit with us. What things went well?  Everything, very comfortable with Dr. Liz and staff.       We're always looking for ways to make our patients' experiences even better. Do you have recommendations on ways we may improve? No    Overall were you satisfied with your first visit to our practice? Yes     I appreciate you taking the time to speak with me today. Is there anything else I can do for you? No      Thank you, and have a great day.       66.7

## 2023-01-25 NOTE — PROGRESS NOTES
Chief Complaint: Tanvi Gaytan is a 47 y.o.. female here today for No chief complaint on file.        History of Present Illness:  Patient presents with management of breast cancer risk.   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  Her mother was diagnosed with breast cancer in her 60s.  She also has a maternal cousin diagnosed with breast cancer in her 30s.  The patient has not had any prior breast biopsies.  She has 2 sons and no daughters.  Her imaging was last performed 2022.  I have personally reviewed those imaging studies along with the reports.  The patient does have scattered fibroglandular densities.  I do not see any masses, suspicious microcalcifications, or areas of architectural distortion.    Review of Systems:  Review of Systems   Skin:        The patient denies any noticeable changes to the skin of the breast   All other systems reviewed and are negative.     I have reviewed the ROS as documented by the MA/LPN/RN Moody Liz MD      Past Medical and Surgical History:  Breast Biopsy History:  Patient has not had a breast biopsy in the past.  Breast Cancer HIstory:  Patient does not have a past medical history of breast cancer.  Breast Operations, and year:  0  Social History     Tobacco Use   Smoking Status Never   Smokeless Tobacco Never     Obstetric History:  Patient is premenopausal, first day of last period: 23  Number of pregnancies:4  Number of live births: 2  Number of abortions or miscarriages: 2  Age of delivery of first child: 32  Patient breast fed, for the following lenth of time:6 mons  Length of time taking birth control pills:6 yrs  Patient has never taken hormone replacement    Past Surgical History:   Procedure Laterality Date   •  SECTION     • DILATATION AND CURETTAGE     • LIPOMA EXCISION     • TONSILLECTOMY         Past Medical History:   Diagnosis Date   • Hypertension    • MTHFR mutation         Prior Hospitalizations, other than for surgery or childbirth, and year:  None    Social History:  Patient is .  Patient has 2 sons.    Family History:  Family History   Problem Relation Age of Onset   • Coronary artery disease Paternal Grandfather    • Hypertension Paternal Grandfather    • Coronary artery disease Paternal Grandmother    • Diabetes Paternal Grandmother    • Hypertension Paternal Grandmother    • Coronary artery disease Maternal Grandmother    • Hypertension Maternal Grandmother    • Coronary artery disease Maternal Grandfather    • Hypertension Maternal Grandfather    • Breast cancer Cousin    • No Known Problems Mother    • No Known Problems Father    • No Known Problems Sister    • No Known Problems Brother    • No Known Problems Daughter    • No Known Problems Son    • No Known Problems Maternal Aunt    • No Known Problems Paternal Aunt    • Deep vein thrombosis Paternal Uncle    • BRCA 1/2 Neg Hx    • Colon cancer Neg Hx    • Endometrial cancer Neg Hx    • Ovarian cancer Neg Hx    • Uterine cancer Neg Hx    • Melanoma Neg Hx    • Prostate cancer Neg Hx        Vital Signs:  Vitals:    01/25/23 1304   BP: 155/93   Pulse: 82   SpO2: 98%       Medications:    Current Outpatient Prescriptions:     Current Outpatient Medications:   •  atorvastatin (LIPITOR) 10 MG tablet, Take 10 mg by mouth Daily., Disp: , Rfl:   •  folic acid (FOLVITE) 1 MG tablet, Take 1 mg by mouth Daily., Disp: , Rfl:   •  metoprolol tartrate (LOPRESSOR) 100 MG tablet, Take 100 mg by mouth 2 (Two) Times a Day., Disp: , Rfl:   •  Multiple Vitamin (MULTIVITAMIN) tablet, Take  by mouth., Disp: , Rfl:     Physical Examination:  General Appearance:   Patient is in no distress.  She is well kept and has a BMI of 30.4.  Psychiatric:  Patient with appropriate mood and affect. Alert and oriented to self, time, and place.    Breast, RIGHT:  large sized, symmetric with the contralateral side.  Breast skin is without erythema,  edema, rashes.   She does have a few irritated spots on her breast likely from some rubbing with her bra.  There is also a little bit of scar formation along the inferior part of the breast related to some problems with her underwire bra.  There are no visible abnormalities upon inspection during the arm-raising maneuver or with hands on hips in the sitting position. There is no nipple retraction, discharge or nipple/areolar skin changes.There are no masses palpable in the sitting or supine positions.  There is not much in the way of compacted breast tissue but she does have fairly uniform dense breast tissue throughout the breast.    Breast, LEFT:  large sized, symmetric with the contralateral side.  Breast skin is without erythema, edema, rashes.  There are no visible abnormalities upon inspection during the arm-raising maneuver or with hands on hips in the sitting position. There is no nipple retraction, discharge or nipple/areolar skin changes.There are no masses palpable in the sitting or supine positions.  There is not much compacted breast tissue along the inferior breast crease.  There is fibrocystic nodularity throughout the breast.    Lymphatic:  There is no axillary, cervical, infraclavicular, or supraclavicular adenopathy bilaterally.    Gastrointestinal:  Abdomen is soft, nondistended, and nontender.  There was no obvious hepatosplenomegaly or abdominal mass.  There are no scars from previous surgery.    Musculoskeletal:  Good strength in all 4 extremities.   There is good range of motion in both shoulders.        Assessment:  1. At high risk for breast cancer    The patient is aware that high risk breast patients are generally identified because of a genetic mutation, strong family history for breast cancer, LCIS, atypical hyperplasia, or a history of radiation to the chest wall under the age of 30.  In her case she does have a strong family history for breast cancer.  She also has a maternal cousin  with breast cancer at a very young age.  This would qualify her for genetic testing and she would like to proceed with that.  Our risk assessment models have estimated her lifetime risk between 20 and 27%.  Her 5-year risk is estimated at 1.8%.  Based on these findings she would qualify to have supplemental MRI in addition to her yearly mammograms.  I think she would also benefit from a clinical breast examination every 6 months.  This could be arranged by alternating 6-month visits with Dr. Winters.  The patient would like to proceed along those lines.  She also could consider medical risk reduction with her slightly elevated 5-year risk for breast cancer.  After discussing the medications used and their potential side effects the patient prefers to hold on that for now.  We can always reconsider as time goes on.    We also talked about the benefits of exercise, maintaining an ideal body weight, and limiting alcohol intake.      Plan:  1.  She already has an MRI scheduled for June and I will be on the look out for those results.   2.  I will see her back in the office in July after her MRI.  3.  She will see the genetic counselor today.  CPT coding:    Next Appointment:  No follow-ups on file.            EMR Dragon/transcription disclaimer:    Much of this encounter note is an electronic transcription/translocation of spoken language to printed text.  The electronic translation of spoken language may permit erroneous, or at times, nonsensical words or phrases to be inadvertently transcribed.  Although I have reviewed the note from such areas, some may still exist.

## 2023-02-07 ENCOUNTER — DOCUMENTATION (OUTPATIENT)
Dept: GENETICS | Facility: HOSPITAL | Age: 48
End: 2023-02-07
Payer: COMMERCIAL

## 2023-02-07 NOTE — PROGRESS NOTES
Tanvi Gaytan, a 47-year-old female, was referred for genetic counseling due to a family history of breast cancer. Ms. Gaytan has no personal history of cancer.  She was 13 years old at menarche and had her first child at age 32. She is premenopausal and retains her uterus and ovaries.  Dr. Moody Liz previously calculated Ms. Gaytan’s lifetime risk for breast cancer based on her family history using computer modeling. He estimated Ms. Gaytan’s lifetime risk for breast cancer to be approximately 20.8% via Tyrer Cuzick v8. This wouldn’t be considered to be high risk for breast cancer. Ms. Gaytan’s most recent mammogram was in December 2022 and was normal. She has been referred to have a breast MRI and then will be alternating every 6 months between a mammogram and breast MRI. Ms. Gaytan has not yet had a colonoscopy but did have a normal Cologuard last year.  She was interested in discussing her risk for a hereditary cancer syndrome.   Ms. Gaytan decided to pursue comprehensive genetic testing to evaluate her risk of cancer, therefore the CancerNext Expanded Panel was ordered through [facility] which analyzes 77 genes associated with an increased cancer risk. Genetic testing was negative for pathogenic mutations in BRCA1/2 and 75 additional genes on this panel. These normal results were discussed with Ms. Gaytan by telephone on 02/07/23.     PERTINENT FAMILY HISTORY: (See attached pedigree)   Mother:   Breast cancer, 60s  Pat Uncle:  Metastatic prostate cancer  Pat Cousin:  Head/Neck cancer  Pat Half-Aunt:  Lung cancer    Ms. Gaytan also mentioned two maternal first cousins once removed with cancer.  One with breast cancer in her 30s and one with thyroid cancer.  We do not have medical records to review regarding any of these diagnoses.       RISK ASSESSMENT:  Ms. Gaytan’s family history of breast cancer raises the question of a hereditary cancer syndrome.  NCCN guidelines for genetic  testing for BRCA1/2 states that individuals with a close relative with metastatic prostate at any age may consider genetic testing. With Ms. Gaytan’s paternal uncle’s diagnosis, she would meet this criteria.  This risk assessment is based on the family history information provided at the time of the appointment.  The assessment could change in the future should new information be obtained.    Because genetic testing was negative, her management should be guided by a family history-based risk assessment. Tyrer-Cuzick, version 8 is able to take into account personal factors (age at first menarche, age at first birth, etc.) and family history when calculating risk for breast cancer. Computer modeling estimates that Ms. Gaytan’s lifetime personal risk for developing breast cancer is up to 25.1% (Felisaer-Deborahzick, v8), compared to the general population risk of 12.5%.  A risk greater than 19% warrants consideration of increased screening for Ms. Gaytan per NCCN guidelines.  This risk assessment is based on the family history information provided at the time of the appointment and could change in the future should new information be obtained.    GENETIC COUNSELING (30 minutes):  We reviewed the family history information in detail. Cases of cancer follow three general patterns: sporadic, familial, and hereditary.  While most cancer is sporadic, some cases appear to occur in family clusters.  These cases are said to be familial and account for 10-20% of cancer cases.  Familial cases may be due to a combination of shared genes and environmental factors among family members.  In even fewer cases, the risk for cancer is inherited, and the genes responsible for the increased cancer risk are known.       Family histories typical of hereditary cancer syndromes usually include multiple first- and second-degree relatives diagnosed with cancer types that define a syndrome.  These cases tend to be diagnosed at  younger-than-expected ages and can be bilateral or multifocal.  The cancer in these families follows an autosomal dominant inheritance pattern, which indicates the likely presence of a mutation in a cancer susceptibility gene.  Children and siblings of an individual believed to carry this mutation have a 50% chance of inheriting that mutation, thereby inheriting the increased risk to develop cancer.  These mutations can be passed down from the maternal or the paternal lineage.     Due to Ms. Gaytan’s family history of breast cancer, we discussed hereditary breast cancer. Hereditary breast cancer accounts for 5-10% of all cases of breast cancer.  A significant proportion (50%) of hereditary breast cancer can be attributed to mutations in the BRCA1 and BRCA2 genes.  Mutations in these genes confer an increased risk for breast cancer, ovarian cancer, male breast cancer, prostate cancer, and pancreatic cancer.  Women with a BRCA1 or BRCA2 mutation have up to an 87% lifetime risk of breast cancer and up to a 44% risk of ovarian cancer.  There are other clinically significant breast cancer related genes in addition to BRCA1/2, including PALB2, LEROY, and CHEK2.     There are other hereditary cancer syndromes as well. Based on Ms. Gaytan’s family history and her desire to get more information regarding her personal risks, testing was pursued through a multigene panel evaluating several other genes known to increase the risk for cancer.     GENETIC TESTING:  The risks, benefits, and limitations of genetic testing and implications for clinical management following testing were reviewed.  DNA test results can influence decisions regarding screening and prevention.  Genetic testing can have significant psychological implications for both individuals and families. Also discussed was the possibility of employment and insurance discrimination based on genetic test results and the laws in place to prevent this, as well as the  limitations of these laws.       We discussed panel testing, which would involve testing 77 genes associated with increased cancer risk. The implications of a positive or negative test result were discussed.  We also discussed the importance of testing an affected relative and how a negative result for Ms. Gaytan wouldn’t necessarily mean the cancers presenting in her family weren’t due to a genetic mutation that Ms. Gaytan did not inherit.  In general, a negative genetic test result is most informative if a mutation has first been established in an affected member of the family.  In cases where an affected individual is not available or interested in testing, it is appropriate to offer testing to an unaffected individual. We discussed the possibility that, in some cases, genetic test results may be ambiguous due to the identification of a genetic variant. These variants may or may not be associated with an increased cancer risk. With multigene panel testing, it is not uncommon for a variant of uncertain significance (VUS) to be identified.  If a VUS is identified, testing family members is typically not recommended and screening recommendations are made based on the family history.  The laboratories that perform genetic testing work to reclassify the VUS and send out an amended report if and when a VUS is reclassified.  The majority of variant findings are ultimately reclassified to a negative result. Given Ms. Gaytan’s family history, a negative test result does not eliminate all cancer risk, although the risk would not be as high as it would with positive genetic testing.     TEST RESULTS: Genetic testing was negative for known pathogenic mutations by sequencing, rearrangement testing, and RNA analysis for the 77 genes on the CancerNext Expanded panel (see attached).  This negative result greatly lowers, but does not eliminate, the risk of a hereditary cancer syndrome for Ms. Gaytan. It is possible that  the family history is due to a hereditary cancer syndrome that Ms. Gaytan did not happen to inherit. This assessment is based on the information provided at the time of the consultation.     CLINICAL MANAGEMENT GUIDELINES: Options available to individuals with an increased lifetime risk (greater than 20%) for breast cancer was discussed, including increased surveillance and chemoprevention.  Given her family history, Ms. Gaytan is at an increased lifetime risk for breast cancer, which warrants increased surveillance in the future.     Increased surveillance, based on NCCN guidelines, would consist of semi-annual clinical breast exam and annual mammography starting 10 years prior to the earliest breast cancer diagnosis in the family, or age 40, whichever is earliest.  According to an American Cancer Society expert panel and NCCN guidelines, annual breast MRI should be offered to women whose lifetime risk of breast cancer is 20-25 percent or more, typically beginning at the same age as mammography. Breast cancer chemoprevention is another option that can be considered in the future. Studies have shown that Tamoxifen and Raloxifene can cut the risk of estrogen receptor positive breast cancer by 50% when taken by high-risk women over a 5-year period.  There are risks and side effects associated with these medications; therefore, the risks versus benefits must be considered prior to deciding to take chemopreventative medications. These assessments are based on the information provided at the time of consultation and could change should new information become available.    PLAN: Genetic counseling remains available to Ms. Gaytan and her family.  She is currently being followed in our high risk breast clinic and plans to continue being seen there.  If she has any questions in the future, she is welcome to call me at [phone].     Yanira Costello, MS, [facility], [facility]  Licensed Certified Genetic Counselor      Cc: Tanvi  Lukas Liz MD

## 2023-06-16 ENCOUNTER — APPOINTMENT (OUTPATIENT)
Dept: OTHER | Facility: HOSPITAL | Age: 48
End: 2023-06-16
Payer: COMMERCIAL

## 2023-06-16 ENCOUNTER — HOSPITAL ENCOUNTER (OUTPATIENT)
Dept: MRI IMAGING | Facility: HOSPITAL | Age: 48
Discharge: HOME | End: 2023-06-16
Payer: COMMERCIAL

## 2023-06-16 DIAGNOSIS — Z09 FOLLOW-UP EXAM: ICD-10-CM

## 2023-06-16 DIAGNOSIS — Z91.89 AT HIGH RISK FOR BREAST CANCER: ICD-10-CM

## 2023-06-16 PROCEDURE — A9577 INJ MULTIHANCE: HCPCS | Performed by: OBSTETRICS & GYNECOLOGY

## 2023-06-16 PROCEDURE — 0 GADOBENATE DIMEGLUMINE 529 MG/ML SOLUTION: Performed by: OBSTETRICS & GYNECOLOGY

## 2023-06-16 PROCEDURE — 82565 ASSAY OF CREATININE: CPT

## 2023-06-16 PROCEDURE — 77049 MRI BREAST C-+ W/CAD BI: CPT

## 2023-06-16 RX ADMIN — GADOBENATE DIMEGLUMINE 18 ML: 529 INJECTION, SOLUTION INTRAVENOUS at 09:38

## 2023-06-17 LAB — CREAT BLDA-MCNC: 0.9 MG/DL (ref 0.6–1.3)

## 2023-12-26 ENCOUNTER — HOSPITAL ENCOUNTER (OUTPATIENT)
Facility: HOSPITAL | Age: 48
Discharge: HOME | End: 2023-12-26
Admitting: OBSTETRICS & GYNECOLOGY
Payer: COMMERCIAL

## 2023-12-26 ENCOUNTER — OFFICE VISIT (OUTPATIENT)
Dept: OBSTETRICS AND GYNECOLOGY | Age: 48
End: 2023-12-26
Payer: COMMERCIAL

## 2023-12-26 VITALS
SYSTOLIC BLOOD PRESSURE: 126 MMHG | BODY MASS INDEX: 31.95 KG/M2 | HEIGHT: 66 IN | DIASTOLIC BLOOD PRESSURE: 76 MMHG | WEIGHT: 198.8 LBS

## 2023-12-26 DIAGNOSIS — Z12.31 ENCOUNTER FOR SCREENING MAMMOGRAM FOR MALIGNANT NEOPLASM OF BREAST: ICD-10-CM

## 2023-12-26 DIAGNOSIS — Z12.31 VISIT FOR SCREENING MAMMOGRAM: ICD-10-CM

## 2023-12-26 DIAGNOSIS — Z01.419 ENCOUNTER FOR GYNECOLOGICAL EXAMINATION: Primary | ICD-10-CM

## 2023-12-26 DIAGNOSIS — Z91.89 AT HIGH RISK FOR BREAST CANCER: ICD-10-CM

## 2023-12-26 PROCEDURE — 77063 BREAST TOMOSYNTHESIS BI: CPT

## 2023-12-26 PROCEDURE — 77067 SCR MAMMO BI INCL CAD: CPT

## 2023-12-26 RX ORDER — ATORVASTATIN CALCIUM 10 MG/1
10 TABLET, FILM COATED ORAL NIGHTLY
COMMUNITY

## 2023-12-26 NOTE — PROGRESS NOTES
".Subjective     Chief Complaint   Patient presents with    Gynecologic Exam     Annual exam, Last Pap 10/27/2020 NEG, HPV NEG, Mammogram today, Pt has no complaints today, Doing well        History of Present Illness    Tanvi Gaytan is a 48 y.o.  who presents for annual exam.  Her menses are regular every 28-30 days, lasting 4-7 days, dysmenorrhea none  She denies any gyn issues.     Her sons are doing well.      Obstetric History:  OB History          6    Para   4    Term   2       2    AB   2    Living   2         SAB   2    IAB        Ectopic        Molar        Multiple        Live Births   2               Menstrual History:     Patient's last menstrual period was 2023 (exact date).         Current contraception: vasectomy  History of abnormal Pap smear: no  Received Gardasil immunization: no  Perform regular self breast exam:  yes  Family history of uterine or ovarian cancer: no  Family History of colon cancer: no  Family history of breast cancer: yes - mother  and mat cousin-she had neg genetic testing through genetics with high risk breast clinic    Mammogram: done today.  Colonoscopy: declines currently;  had neg cologuard   DEXA: not indicated.    Exercise: moderately active  Calcium/Vitamin D: adequate intake    The following portions of the patient's history were reviewed and updated as appropriate: allergies, current medications, past family history, past medical history, past social history, past surgical history, and problem list.    Review of Systems    Review of Systems   Constitutional: Negative for fatigue.   Respiratory: Negative for shortness of breath.    Gastrointestinal: Negative for abdominal pain or change in bowel habits   Genitourinary: Negative for abnormal bleeding  Neurological: Negative for severe headaches.   Psychiatric/Behavioral: Negative for dysphoric mood.         Objective   Physical Exam    /76   Ht 167.6 cm (66\")   Wt 90.2 kg (198 lb " 12.8 oz)   LMP 12/16/2023 (Exact Date)   BMI 32.09 kg/m²   General:   Alert, in no distress   Heart: regular rate and rhythm   Lungs: clear to auscultation bilaterally   Breast: .Inspection is negative. Left breast is without masses, retractions, nipple discharge or axillary adenopathy. Right breast is without masses, retractions, nipple discharge or axillary adenopathy.     Neck: Supple, no thyromegaly   Abdomen: Soft, no tenderness or guarding   Pelvis: External genitalia: normal general appearance  Urinary system: urethral meatus normal  Vaginal: normal mucosa without prolapse or lesions  Cervix: normal appearance  Adnexa: no masses or tenderness  Uterus: normal, nontender   Extremities: Normal without edema   Neurologic: Alert and oriented   Psychiatric: Normal affect, judgment and mood     Assessment & Plan   Diagnoses and all orders for this visit:    1. Encounter for gynecological examination (Primary)    2. At high risk for breast cancer        All questions answered.  Breast self exam technique reviewed and patient encouraged to perform self-exam monthly.  Discussed healthy lifestyle modifications.  Recommended 30 minutes of aerobic exercise five times per week.  Pap def as up to date and pt agrees;  advised pt to call if she does not receive results of mammogram within 2 weeks    She will continue high risk breast surveillance through Dr. Liz.

## 2023-12-27 DIAGNOSIS — Z12.31 VISIT FOR SCREENING MAMMOGRAM: Primary | ICD-10-CM

## 2024-02-20 ENCOUNTER — TELEPHONE (OUTPATIENT)
Dept: MAMMOGRAPHY | Facility: CLINIC | Age: 49
End: 2024-02-20
Payer: COMMERCIAL

## 2024-02-20 NOTE — TELEPHONE ENCOUNTER
Caller: CRISSY CHAUDHARI    Relationship to patient: SELF    Best call back number: [phone]     Patient is needing: PT HAS CHANGED INSURANCE, WHICH IS UPDATED IN THE SYSTEM NOW, AND WANTS TO BE SURE PRE-APPROVAL DONE FOR THE CORRECT INSURANCE  FOR THE MRI SCHEDULED IN JUNE.

## 2024-02-21 NOTE — TELEPHONE ENCOUNTER
Pt notified this will be authorized closer to time of MRI.  Her insurance has been updated in EPIC and that will be the company we call to obtain the authorization.     MAKENZIE

## 2024-06-18 ENCOUNTER — HOSPITAL ENCOUNTER (OUTPATIENT)
Dept: MRI IMAGING | Facility: HOSPITAL | Age: 49
Discharge: HOME | End: 2024-06-18
Admitting: SURGERY
Payer: COMMERCIAL

## 2024-06-18 DIAGNOSIS — Z91.89 AT HIGH RISK FOR BREAST CANCER: ICD-10-CM

## 2024-06-18 PROCEDURE — A9577 INJ MULTIHANCE: HCPCS | Performed by: SURGERY

## 2024-06-18 PROCEDURE — 0 GADOBENATE DIMEGLUMINE 529 MG/ML SOLUTION: Performed by: SURGERY

## 2024-06-18 PROCEDURE — 77049 MRI BREAST C-+ W/CAD BI: CPT

## 2024-06-18 RX ADMIN — GADOBENATE DIMEGLUMINE 19 ML: 529 INJECTION, SOLUTION INTRAVENOUS at 09:44

## 2024-06-19 ENCOUNTER — DOCUMENTATION (OUTPATIENT)
Dept: MAMMOGRAPHY | Facility: CLINIC | Age: 49
End: 2024-06-19
Payer: COMMERCIAL

## 2024-06-19 NOTE — PROGRESS NOTES
I spoke with her today and told her that the recent MRI did not show any suspicious enhancement in either breast or problems in the lymph nodes.  I will see her in the office next month.

## 2024-07-15 ENCOUNTER — OFFICE VISIT (OUTPATIENT)
Dept: MAMMOGRAPHY | Facility: CLINIC | Age: 49
End: 2024-07-15
Payer: COMMERCIAL

## 2024-07-15 VITALS
WEIGHT: 196 LBS | HEART RATE: 88 BPM | OXYGEN SATURATION: 98 % | HEIGHT: 66 IN | BODY MASS INDEX: 31.5 KG/M2 | SYSTOLIC BLOOD PRESSURE: 165 MMHG | DIASTOLIC BLOOD PRESSURE: 101 MMHG

## 2024-07-15 DIAGNOSIS — Z91.89 AT HIGH RISK FOR BREAST CANCER: Primary | ICD-10-CM

## 2024-07-15 PROCEDURE — 99213 OFFICE O/P EST LOW 20 MIN: CPT | Performed by: SURGERY

## 2024-07-15 NOTE — LETTER
July 15, 2024     Marcela Winters MD  [address]  [address]  [facility] [address]    Patient: Tanvi Gaytan   YOB: 1975   Date of Visit: 7/15/2024     Dear Marcela Winters MD:       Thank you for referring Tanvi Gaytan to me for evaluation. Below are the relevant portions of my assessment and plan of care.    If you have questions, please do not hesitate to call me. I look forward to following Tanvi along with you.         Sincerely,        Moody Liz MD        CC: Marina Ferrera, ANNA MARIE Liz, Moody CARRASCO MD  07/15/24 1005  Sign when Signing Visit  Subjective  Tanvi Gaytan is a 48 y.o. female     History of Present Illness   She is a nice 48-year-old white female felt to be at high risk for breast cancer.  Previous risk assessment is estimated her lifetime risk between 20 and 27%.  Her 5-year risk was slightly elevated at 1.8%.  Her imaging is up-to-date.  In  she had an MRI performed and I have reviewed all of those reports.  There was no suspicious masslike or non-mass like enhancement in either breast nor were there any enlarged lymph nodes.  She had mammograms in 2023 and I personally reviewed those images along with the reports.  She has scattered fibroglandular densities but no suspicious calcifications, masses, or areas of architectural distortion.    There have been no new cases of breast cancer diagnosed in her family since I last saw her.  Her mom however had breast cancer and was recently diagnosed with a primary liver cancer.    The patient has previously undergone negative genetic testing.    Review of Systems constitutional-no unusual changes in weight or appetite.  Past Medical History:   Diagnosis Date   • Hypertension    • MTHFR mutation      Past Surgical History:   Procedure Laterality Date   •  SECTION     • DILATATION AND CURETTAGE     • LIPOMA EXCISION     • TONSILLECTOMY       Family History   Problem Relation Age  of Onset   • Coronary artery disease Paternal Grandfather    • Hypertension Paternal Grandfather    • Coronary artery disease Paternal Grandmother    • Diabetes Paternal Grandmother    • Hypertension Paternal Grandmother    • Coronary artery disease Maternal Grandmother    • Hypertension Maternal Grandmother    • Coronary artery disease Maternal Grandfather    • Hypertension Maternal Grandfather    • Breast cancer Cousin    • No Known Problems Mother    • No Known Problems Father    • No Known Problems Sister    • No Known Problems Brother    • No Known Problems Daughter    • No Known Problems Son    • No Known Problems Maternal Aunt    • No Known Problems Paternal Aunt    • Deep vein thrombosis Paternal Uncle    • BRCA 1/2 Neg Hx    • Colon cancer Neg Hx    • Endometrial cancer Neg Hx    • Ovarian cancer Neg Hx    • Uterine cancer Neg Hx    • Melanoma Neg Hx    • Prostate cancer Neg Hx      Social History     Socioeconomic History   • Marital status:    Tobacco Use   • Smoking status: Never   • Smokeless tobacco: Never   Vaping Use   • Vaping status: Never Used   Substance and Sexual Activity   • Alcohol use: No   • Drug use: No   • Sexual activity: Yes     Partners: Male     Birth control/protection: Surgical       Objective  Physical Exam  BMI is >= 30 and <35. (Class 1 Obesity). The following options were offered after discussion;: exercise counseling/recommendations and information on healthy weight added to patient's after visit summary   Right breast-large and symmetrical.  The skin of the breast looks normal and there was no nipple discharge.  With the arms raised and the pectoralis muscle contracted, there was no skin dimpling or nipple retraction.  There were no worrisome palpable masses noted.  Left breast-large and symmetrical with no changes to the skin of the breast.  I could not express any nipple discharge.  When the arms were maneuvered, there was no skin dimpling or nipple retraction.  I did  not palpate any masses that were concerning.  Lymphatics-no cervical or axillary adenopathy.    Assessment & Plan  1.  At high risk for breast cancer-the patient's imaging is in good order.  I have placed orders for her next MRI in June 2025.  She should have mammograms in December 2024.  We again talked about the possibility of chemoprevention but with her 5-year risk estimate I would not push it and she is not inclined to do that.  I will see her back in the office in 1 year.    2.  Class I obesity-the patient is walking for exercise and understands the importance of that along with maintaining an ideal body weight and limiting alcohol intake.  Information regarding unhealthy weight gain was added to the after visit summary.  There were no encounter diagnoses.

## 2024-07-15 NOTE — PROGRESS NOTES
Subjective   Tanvi Gaytan is a 48 y.o. female     History of Present Illness   She is a nice 48-year-old white female felt to be at high risk for breast cancer.  Previous risk assessment is estimated her lifetime risk between 20 and 27%.  Her 5-year risk was slightly elevated at 1.8%.  Her imaging is up-to-date.  In  she had an MRI performed and I have reviewed all of those reports.  There was no suspicious masslike or non-mass like enhancement in either breast nor were there any enlarged lymph nodes.  She had mammograms in 2023 and I personally reviewed those images along with the reports.  She has scattered fibroglandular densities but no suspicious calcifications, masses, or areas of architectural distortion.    There have been no new cases of breast cancer diagnosed in her family since I last saw her.  Her mom however had breast cancer and was recently diagnosed with a primary liver cancer.    The patient has previously undergone negative genetic testing.    Review of Systems constitutional-no unusual changes in weight or appetite.  Past Medical History:   Diagnosis Date    Hypertension     MTHFR mutation      Past Surgical History:   Procedure Laterality Date     SECTION      DILATATION AND CURETTAGE      LIPOMA EXCISION      TONSILLECTOMY       Family History   Problem Relation Age of Onset    Coronary artery disease Paternal Grandfather     Hypertension Paternal Grandfather     Coronary artery disease Paternal Grandmother     Diabetes Paternal Grandmother     Hypertension Paternal Grandmother     Coronary artery disease Maternal Grandmother     Hypertension Maternal Grandmother     Coronary artery disease Maternal Grandfather     Hypertension Maternal Grandfather     Breast cancer Cousin     No Known Problems Mother     No Known Problems Father     No Known Problems Sister     No Known Problems Brother     No Known Problems Daughter     No Known Problems Son     No Known Problems  Maternal Aunt     No Known Problems Paternal Aunt     Deep vein thrombosis Paternal Uncle     BRCA 1/2 Neg Hx     Colon cancer Neg Hx     Endometrial cancer Neg Hx     Ovarian cancer Neg Hx     Uterine cancer Neg Hx     Melanoma Neg Hx     Prostate cancer Neg Hx      Social History     Socioeconomic History    Marital status:    Tobacco Use    Smoking status: Never    Smokeless tobacco: Never   Vaping Use    Vaping status: Never Used   Substance and Sexual Activity    Alcohol use: No    Drug use: No    Sexual activity: Yes     Partners: Male     Birth control/protection: Surgical       Objective   Physical Exam  BMI is >= 30 and <35. (Class 1 Obesity). The following options were offered after discussion;: exercise counseling/recommendations and information on healthy weight added to patient's after visit summary   Right breast-large and symmetrical.  The skin of the breast looks normal and there was no nipple discharge.  With the arms raised and the pectoralis muscle contracted, there was no skin dimpling or nipple retraction.  There were no worrisome palpable masses noted.  Left breast-large and symmetrical with no changes to the skin of the breast.  I could not express any nipple discharge.  When the arms were maneuvered, there was no skin dimpling or nipple retraction.  I did not palpate any masses that were concerning.  Lymphatics-no cervical or axillary adenopathy.    Assessment & Plan   1.  At high risk for breast cancer-the patient's imaging is in good order.  I have placed orders for her next MRI in June 2025.  She should have mammograms in December 2024.  We again talked about the possibility of chemoprevention but with her 5-year risk estimate I would not push it and she is not inclined to do that.  I will see her back in the office in 1 year.    2.  Class I obesity-the patient is walking for exercise and understands the importance of that along with maintaining an ideal body weight and limiting  alcohol intake.  Information regarding unhealthy weight gain was added to the after visit summary.  There were no encounter diagnoses.

## 2024-10-30 ENCOUNTER — TELEPHONE (OUTPATIENT)
Dept: OBSTETRICS AND GYNECOLOGY | Age: 49
End: 2024-10-30

## 2024-10-30 NOTE — TELEPHONE ENCOUNTER
Caller: Tanvi Gaytan    Relationship: Self    Best call back number:[phone]    What is the best time to reach you: ANY    Who are you requesting to speak with (clinical staff, provider,  specific staff member):        What was the call regarding: PT RETURNING CALL TO [facility] ANNUAL AND MAMMO; UNABLE TO WT

## 2025-01-08 NOTE — PROGRESS NOTES
Subjective     Chief Complaint   Patient presents with    Gynecologic Exam     AE today, last pap 2022 (-), HPV (-), MG today. Pt has no complaints.        History of Present Illness    Tanvi Gaytan is a 49 y.o.  who presents for annual exam.  Her menses are regular every 28-30 days, lasting 4-7 days, dysmenorrhea none     She has no complaints today. Her sons are both doing well. Oldest is driving and loves his big truck.   Obstetric History:  OB History          6    Para   4    Term   2       2    AB   2    Living   2         SAB   2    IAB        Ectopic        Molar        Multiple        Live Births   2               Menstrual History:     Patient's last menstrual period was 2024 (exact date).         Current contraception: vasectomy  History of abnormal Pap smear: no  Received Gardasil immunization: no  Perform regular self breast exam:  yes  Family history of uterine or ovarian cancer: no  Family History of colon cancer: no  Family history of breast cancer: yes - mother/mat cousin, pt had neg genetic testing through counselor    Mammogram: scheduled today  Colonoscopy: pt has neg cologuard , she plans of having colonoscopy when next due. She declines referral noting she will go to family [facility].  DEXA: not indicated.    Exercise: moderately active  Calcium/Vitamin D: adequate intake    The following portions of the patient's history were reviewed and updated as appropriate: allergies, current medications, past family history, past medical history, past social history, past surgical history, and problem list.    Review of Systems    Review of Systems   Constitutional: Negative for fatigue.   Respiratory: Negative for shortness of breath.    Gastrointestinal: Negative for abdominal pain.   Genitourinary: Negative for abnormal bleeding  Neurological: Negative for severe headaches.   Psychiatric/Behavioral: Negative for dysphoric mood.         Objective   Physical  "Exam    /70   Ht 167.6 cm (66\")   Wt 91.7 kg (202 lb 3.2 oz)   LMP 12/22/2024 (Exact Date)   BMI 32.64 kg/m²   General:   Alert, in no distress   Heart: regular rate and rhythm   Lungs: clear to auscultation bilaterally   Breast: Inspection is negative. Left breast is without masses, retractions, nipple discharge or axillary adenopathy. Right breast is without masses, retractions, nipple discharge or axillary adenopathy.     Neck: Supple, no thyromegaly   Abdomen: Soft, no tenderness or guarding   Pelvis: External genitalia: normal general appearance  Urinary system: urethral meatus normal  Vaginal: normal mucosa without prolapse or lesions  Cervix: normal appearance  Adnexa: no masses or tenderness  Uterus: normal, nontender   Extremities: Normal without edema   Neurologic: Alert and oriented   Psychiatric: Normal affect, judgment and mood     Assessment & Plan   Diagnoses and all orders for this visit:    1. Encounter for gynecological examination (Primary)    2. Encounter for screening mammogram for malignant neoplasm of breast  -     Mammo Screening Digital Tomosynthesis Bilateral With CAD; Future    3. At high risk for breast cancer        All questions answered.  Breast self exam technique reviewed and patient encouraged to perform self-exam monthly.  Discussed healthy lifestyle modifications.  Recommended 30 minutes of aerobic exercise five times per week.  Pap def as up to date, advised pt to call if she does not receive results of mammogram within 2 weeks    She will continue high risk breast surveillance with clinic provider              "

## 2025-01-10 ENCOUNTER — HOSPITAL ENCOUNTER (OUTPATIENT)
Facility: HOSPITAL | Age: 50
Discharge: HOME | End: 2025-01-10
Admitting: OBSTETRICS & GYNECOLOGY
Payer: COMMERCIAL

## 2025-01-10 ENCOUNTER — OFFICE VISIT (OUTPATIENT)
Dept: OBSTETRICS AND GYNECOLOGY | Age: 50
End: 2025-01-10
Payer: COMMERCIAL

## 2025-01-10 VITALS
BODY MASS INDEX: 32.5 KG/M2 | HEIGHT: 66 IN | SYSTOLIC BLOOD PRESSURE: 122 MMHG | DIASTOLIC BLOOD PRESSURE: 70 MMHG | WEIGHT: 202.2 LBS

## 2025-01-10 DIAGNOSIS — Z12.31 ENCOUNTER FOR SCREENING MAMMOGRAM FOR MALIGNANT NEOPLASM OF BREAST: ICD-10-CM

## 2025-01-10 DIAGNOSIS — Z91.89 AT HIGH RISK FOR BREAST CANCER: ICD-10-CM

## 2025-01-10 DIAGNOSIS — Z01.419 ENCOUNTER FOR GYNECOLOGICAL EXAMINATION: Primary | ICD-10-CM

## 2025-01-10 PROCEDURE — 77063 BREAST TOMOSYNTHESIS BI: CPT

## 2025-01-10 PROCEDURE — 77067 SCR MAMMO BI INCL CAD: CPT

## 2025-02-05 ENCOUNTER — TELEPHONE (OUTPATIENT)
Dept: MAMMOGRAPHY | Facility: CLINIC | Age: 50
End: 2025-02-05
Payer: COMMERCIAL

## 2025-02-17 ENCOUNTER — TELEPHONE (OUTPATIENT)
Dept: MAMMOGRAPHY | Facility: CLINIC | Age: 50
End: 2025-02-17
Payer: COMMERCIAL

## 2025-06-19 ENCOUNTER — HOSPITAL ENCOUNTER (OUTPATIENT)
Dept: MRI IMAGING | Facility: HOSPITAL | Age: 50
Discharge: HOME | End: 2025-06-19
Admitting: SURGERY
Payer: COMMERCIAL

## 2025-06-19 DIAGNOSIS — Z91.89 AT HIGH RISK FOR BREAST CANCER: ICD-10-CM

## 2025-06-19 PROCEDURE — 25510000002 GADOBENATE DIMEGLUMINE 529 MG/ML SOLUTION: Performed by: OBSTETRICS & GYNECOLOGY

## 2025-06-19 PROCEDURE — 77049 MRI BREAST C-+ W/CAD BI: CPT

## 2025-06-19 PROCEDURE — A9577 INJ MULTIHANCE: HCPCS | Performed by: OBSTETRICS & GYNECOLOGY

## 2025-06-19 RX ADMIN — GADOBENATE DIMEGLUMINE 19 ML: 529 INJECTION, SOLUTION INTRAVENOUS at 08:14

## 2025-08-08 ENCOUNTER — TELEPHONE (OUTPATIENT)
Dept: SURGERY | Facility: CLINIC | Age: 50
End: 2025-08-08
Payer: COMMERCIAL

## 2025-08-08 ENCOUNTER — OFFICE VISIT (OUTPATIENT)
Dept: SURGERY | Facility: CLINIC | Age: 50
End: 2025-08-08
Payer: COMMERCIAL

## 2025-08-08 VITALS
WEIGHT: 198.6 LBS | HEART RATE: 75 BPM | SYSTOLIC BLOOD PRESSURE: 136 MMHG | RESPIRATION RATE: 16 BRPM | DIASTOLIC BLOOD PRESSURE: 92 MMHG | OXYGEN SATURATION: 98 % | HEIGHT: 66 IN | BODY MASS INDEX: 31.92 KG/M2

## 2025-08-08 DIAGNOSIS — Z91.89 AT HIGH RISK FOR BREAST CANCER: Primary | ICD-10-CM
